# Patient Record
Sex: MALE | Race: WHITE | Employment: FULL TIME | URBAN - METROPOLITAN AREA
[De-identification: names, ages, dates, MRNs, and addresses within clinical notes are randomized per-mention and may not be internally consistent; named-entity substitution may affect disease eponyms.]

---

## 2017-09-19 ENCOUNTER — OFFICE VISIT (OUTPATIENT)
Dept: FAMILY MEDICINE CLINIC | Facility: CLINIC | Age: 22
End: 2017-09-19

## 2017-09-19 VITALS
BODY MASS INDEX: 27.2 KG/M2 | DIASTOLIC BLOOD PRESSURE: 80 MMHG | HEART RATE: 76 BPM | RESPIRATION RATE: 18 BRPM | TEMPERATURE: 98 F | SYSTOLIC BLOOD PRESSURE: 122 MMHG | HEIGHT: 70.5 IN | WEIGHT: 192.13 LBS

## 2017-09-19 DIAGNOSIS — B02.9 HERPES ZOSTER WITHOUT COMPLICATION: Primary | ICD-10-CM

## 2017-09-19 PROCEDURE — 99213 OFFICE O/P EST LOW 20 MIN: CPT | Performed by: FAMILY MEDICINE

## 2017-09-19 RX ORDER — LISDEXAMFETAMINE DIMESYLATE 70 MG/1
CAPSULE ORAL
Refills: 0 | COMMUNITY
Start: 2017-08-17 | End: 2021-04-13

## 2017-09-19 RX ORDER — VALACYCLOVIR HYDROCHLORIDE 1 G/1
1 TABLET, FILM COATED ORAL 3 TIMES DAILY
Qty: 21 TABLET | Refills: 0 | Status: SHIPPED | OUTPATIENT
Start: 2017-09-19 | End: 2017-09-26

## 2017-09-19 RX ORDER — OMEGA-3 FATTY ACIDS/FISH OIL 300-1000MG
CAPSULE ORAL AS NEEDED
COMMUNITY

## 2018-09-07 ENCOUNTER — OFFICE VISIT (OUTPATIENT)
Dept: FAMILY MEDICINE CLINIC | Facility: CLINIC | Age: 23
End: 2018-09-07
Payer: COMMERCIAL

## 2018-09-07 VITALS
DIASTOLIC BLOOD PRESSURE: 82 MMHG | WEIGHT: 193 LBS | SYSTOLIC BLOOD PRESSURE: 122 MMHG | HEART RATE: 88 BPM | TEMPERATURE: 98 F | BODY MASS INDEX: 27 KG/M2

## 2018-09-07 DIAGNOSIS — H66.90 ACUTE OTITIS MEDIA, UNSPECIFIED OTITIS MEDIA TYPE: Primary | ICD-10-CM

## 2018-09-07 PROCEDURE — 99213 OFFICE O/P EST LOW 20 MIN: CPT | Performed by: FAMILY MEDICINE

## 2018-09-07 RX ORDER — NEOMYCIN SULFATE, POLYMYXIN B SULFATE AND HYDROCORTISONE 10; 3.5; 1 MG/ML; MG/ML; [USP'U]/ML
4 SUSPENSION/ DROPS AURICULAR (OTIC) 4 TIMES DAILY
Qty: 1 BOTTLE | Refills: 1 | Status: SHIPPED | OUTPATIENT
Start: 2018-09-07 | End: 2018-09-12

## 2018-09-07 NOTE — PROGRESS NOTES
[facility] [place]  PROGRESS NOTE  Chief Complaint:   Patient presents with:  Ear Pain: left      HPI:   This is a 20year old male coming in for some mild discomfort in the left ear and drainage over the past couple days.   Did notice some drain normal.  Left TM shows inflamed TM with perforation noted inferiorly. There is mild moisture in the ear canal and around the TM. Mouth is normal.  Throat is normal.  Neck: No lymphadenopathy or tenderness.     ASSESSMENT AND PLAN:   Anabel Ha was seen today

## 2020-08-30 NOTE — PROGRESS NOTES
[facility] [place]  PROGRESS NOTE  Chief Complaint:   Patient presents with:  Rash: lower back, tingles, burning earlier today, since wednesday      HPI:   This is a 23year old male coming in for a rash on the lower right side of his back.   He wheezing, cough or sputum. GASTROINTESTINAL:  Denies abdominal pain, nausea, vomiting, constipation, diarrhea, or blood in stool. MUSCULOSKELETAL:  Denies weakness, muscle aches, back pain, joint pain, swelling or stiffness.   NEUROLOGICAL:  Denies headac Problem List:     Herpes zoster without complication      Theresa Chandler MD  9/19/2017  3:12 PM Daily every 1-2 PPD smoker for 25 years. Endorses marijuana and nitrous oxide use

## 2020-09-14 ENCOUNTER — TELEPHONE (OUTPATIENT)
Dept: FAMILY MEDICINE CLINIC | Facility: CLINIC | Age: 25
End: 2020-09-14

## 2020-09-14 DIAGNOSIS — M54.50 ACUTE MIDLINE LOW BACK PAIN WITHOUT SCIATICA: Primary | ICD-10-CM

## 2020-09-14 NOTE — TELEPHONE ENCOUNTER
Patient informed order for Covid19 testing sent  To [facility]. He will call for Appt.   Alexey Aguirre, 09/14/20, 1:36 PM

## 2020-09-14 NOTE — TELEPHONE ENCOUNTER
Needs an order from doctor to go thru the drive thru to get tested for Covid, please give mom a call back. No future appointments.

## 2020-09-14 NOTE — TELEPHONE ENCOUNTER
Patient states he went to [facility] and testing supplies are low. They will not test patient. Requesting  Order for [id number] test to be sent to  [facility].   Sangeeta Ramos, 09/14/20, 12:39 PM

## 2020-09-14 NOTE — TELEPHONE ENCOUNTER
Patient states yesterday he had a temp of 99. Body aches/Slight cough/Sinus congestion. Today no fever, but other sx persist.    Patient states his boss wanting patient to have  Covid19 testing.     Advised [facility] or   [facility]

## 2020-09-14 NOTE — TELEPHONE ENCOUNTER
See previous phone note. Explained to Mom that we do not have a consent on file to speak with her.   Genet Thompson, 09/14/20, 12:40 PM

## 2020-09-15 ENCOUNTER — TELEPHONE (OUTPATIENT)
Dept: FAMILY MEDICINE CLINIC | Facility: CLINIC | Age: 25
End: 2020-09-15

## 2020-09-15 LAB
AMB EXT COVID-19 RESULT: DETECTED
COVID 19 (SARS COV 2): POSITIVE

## 2020-09-15 NOTE — TELEPHONE ENCOUNTER
Patient informed of below. Expressed understanding. Will keep phone note open to call patient tomorrow.   Lucia Guy, 09/15/20, 3:49 PM

## 2020-09-15 NOTE — TELEPHONE ENCOUNTER
Please call Ann Patton. He does have COVID-19. He needs to remain quarantined for the next 10 days or if he remains ill for longer than 10 days he needs to stay at home for 3 days past his last symptom.   He should avoid contact with other people as much as

## 2020-09-16 RX ORDER — AZITHROMYCIN 250 MG/1
TABLET, FILM COATED ORAL
Qty: 6 TABLET | Refills: 0 | Status: SHIPPED | OUTPATIENT
Start: 2020-09-16 | End: 2020-09-21

## 2020-09-16 NOTE — TELEPHONE ENCOUNTER
I will send in a prescription for it azithromycin for him. He should take 2 tablets today, then 1 daily for the next 4 days. Continue to stay in quarantine and drink lots of fluids.

## 2020-09-16 NOTE — TELEPHONE ENCOUNTER
Patient states he still feels like he has a sinus infection. No Fever. Congested/Occsional cough/Back Muscles ache/Can take a deep breath without cough. Please advise.   Edwar Dixon, 09/16/20, 10:45 AM

## 2020-09-26 ENCOUNTER — VIRTUAL PHONE E/M (OUTPATIENT)
Dept: FAMILY MEDICINE CLINIC | Facility: CLINIC | Age: 25
End: 2020-09-26
Payer: COMMERCIAL

## 2020-09-26 DIAGNOSIS — U07.1 LAB TEST POSITIVE FOR DETECTION OF COVID-19 VIRUS: Primary | ICD-10-CM

## 2020-09-26 PROBLEM — F90.2 ATTENTION DEFICIT HYPERACTIVITY DISORDER (ADHD), COMBINED TYPE: Status: ACTIVE | Noted: 2018-03-28

## 2020-09-26 PROCEDURE — G2012 BRIEF CHECK IN BY MD/QHP: HCPCS | Performed by: NURSE PRACTITIONER

## 2020-09-26 NOTE — PROGRESS NOTES
Telephone Check-In    Marisabel Marcum verbally consents to a Virtual/Telephone Check-In service on 09/26/20. Patient understands and accepts financial responsibility for any deductible, co-insurance and/or co-pays associated with this service.     Renzo

## 2020-09-28 ENCOUNTER — TELEPHONE (OUTPATIENT)
Dept: FAMILY MEDICINE CLINIC | Facility: CLINIC | Age: 25
End: 2020-09-28

## 2020-09-28 NOTE — TELEPHONE ENCOUNTER
Pt had virtual visit on Saturday. Pt works for [employer]- pt was supposed to go back today. Pt states his employer did not accept letter that was written on 9/26. Pt states he was told that he would need a negative covid test to return.     Pt states she was

## 2020-09-28 NOTE — TELEPHONE ENCOUNTER
Pt called he wants a c/b regarding \"some recent health concerns\" no further details were given. Please c/b.

## 2020-09-28 NOTE — TELEPHONE ENCOUNTER
[employer] message sent, notified. Referred to the CDC guidelines for confirmed COVID testing with symptoms. Can still test positive for three months after initial positive test as outlined by CDC. Website link attached.   As outlined, you can still test

## 2020-10-23 ENCOUNTER — TELEPHONE (OUTPATIENT)
Dept: FAMILY MEDICINE CLINIC | Facility: CLINIC | Age: 25
End: 2020-10-23

## 2020-10-23 NOTE — TELEPHONE ENCOUNTER
M/ALEJA Ospina out of office today. Informed will give her the message for Monday.   Son Parker, 10/23/20, 4:16 PM

## 2020-10-23 NOTE — TELEPHONE ENCOUNTER
Wants to know if the paperwok he dropped off yesterday for Guanakito Garcia to fill out is ready, would like a call back today.

## 2020-10-26 ENCOUNTER — TELEPHONE (OUTPATIENT)
Dept: FAMILY MEDICINE CLINIC | Facility: CLINIC | Age: 25
End: 2020-10-26

## 2020-10-26 NOTE — TELEPHONE ENCOUNTER
Forms completed/signed. Please let the patient know, he can  copy at his convenience, I placed a copy at the  already.

## 2021-04-13 ENCOUNTER — OFFICE VISIT (OUTPATIENT)
Dept: FAMILY MEDICINE CLINIC | Facility: CLINIC | Age: 26
End: 2021-04-13
Payer: COMMERCIAL

## 2021-04-13 VITALS
BODY MASS INDEX: 25.77 KG/M2 | WEIGHT: 180 LBS | OXYGEN SATURATION: 98 % | SYSTOLIC BLOOD PRESSURE: 138 MMHG | HEIGHT: 70 IN | DIASTOLIC BLOOD PRESSURE: 90 MMHG | HEART RATE: 90 BPM | TEMPERATURE: 98 F

## 2021-04-13 DIAGNOSIS — R19.7 DIARRHEA, UNSPECIFIED TYPE: ICD-10-CM

## 2021-04-13 DIAGNOSIS — H65.03 NON-RECURRENT ACUTE SEROUS OTITIS MEDIA OF BOTH EARS: ICD-10-CM

## 2021-04-13 DIAGNOSIS — J06.9 UPPER RESPIRATORY TRACT INFECTION, UNSPECIFIED TYPE: Primary | ICD-10-CM

## 2021-04-13 PROCEDURE — 99213 OFFICE O/P EST LOW 20 MIN: CPT | Performed by: NURSE PRACTITIONER

## 2021-04-13 PROCEDURE — 3080F DIAST BP >= 90 MM HG: CPT | Performed by: NURSE PRACTITIONER

## 2021-04-13 PROCEDURE — 3008F BODY MASS INDEX DOCD: CPT | Performed by: NURSE PRACTITIONER

## 2021-04-13 PROCEDURE — 3075F SYST BP GE 130 - 139MM HG: CPT | Performed by: NURSE PRACTITIONER

## 2021-04-13 RX ORDER — AZITHROMYCIN 250 MG/1
TABLET, FILM COATED ORAL
Qty: 6 TABLET | Refills: 0 | Status: SHIPPED | OUTPATIENT
Start: 2021-04-13 | End: 2021-04-18

## 2021-04-13 NOTE — PATIENT INSTRUCTIONS
Quarantine until have Covid results. Directed to take antibiotics until gone. Recommend to eat with the antibiotic. Treat symptoms as needed. Return to clinic if not better in 48-72 hours. Otherwise follow-up as needed.

## 2021-04-13 NOTE — PROGRESS NOTES
HPI:    Patient ID: Amanda Carlson is a 30year old male. HPI    Cough that started over the weekend. Was worse yesterday. Also got chills yesterday morning at work. Temp was 97.9 when chilled. Took aleve yesterday morning. No further chills. Frontal sinus tenderness present. Left Sinus: Frontal sinus tenderness present. Mouth/Throat:      Pharynx: Posterior oropharyngeal erythema present. Eyes:      Conjunctiva/sclera: Conjunctivae normal.   Neck:      Thyroid: No thyromegaly.    Ca

## 2021-04-14 ENCOUNTER — TELEPHONE (OUTPATIENT)
Dept: FAMILY MEDICINE CLINIC | Facility: CLINIC | Age: 26
End: 2021-04-14

## 2021-04-14 NOTE — TELEPHONE ENCOUNTER
Chart reviewed. Patient is active on [employer] and report shows that they have read the below result note/recommendations attached to their recent lab report and/or read the message I sent them explaining their results and provider recommendations.

## 2021-04-14 NOTE — TELEPHONE ENCOUNTER
----- Message from ADELFO Sexton sent at 4/14/2021  3:59 PM CDT -----  Please let patient know the Covid test is negative. Thank you.

## 2021-09-10 ENCOUNTER — TELEPHONE (OUTPATIENT)
Dept: FAMILY MEDICINE CLINIC | Facility: CLINIC | Age: 26
End: 2021-09-10

## 2021-09-10 NOTE — TELEPHONE ENCOUNTER
Patient states he was at work. States a zippered bag that her threw on the floor came back up and scratched his right eye. States his eye is red and that the zipper injured his actual eyeball. Patient informed there are no openings today [place] [facility]   [place]

## 2021-12-22 ENCOUNTER — TELEPHONE (OUTPATIENT)
Dept: FAMILY MEDICINE CLINIC | Facility: CLINIC | Age: 26
End: 2021-12-22

## 2021-12-22 NOTE — TELEPHONE ENCOUNTER
Patient exposed to covid19 as above. Patient informed there are no openings today at  [facility] for a video visit. Advised to find testing in the community as there are a few to choose from/agreed.

## 2021-12-22 NOTE — TELEPHONE ENCOUNTER
pt was exposed to covid and employer will not let him  return until tested with negative result- wants order to be sent to local [email or web address]

## 2023-01-12 ENCOUNTER — OFFICE VISIT (OUTPATIENT)
Dept: FAMILY MEDICINE CLINIC | Facility: CLINIC | Age: 28
End: 2023-01-12
Payer: COMMERCIAL

## 2023-01-12 VITALS
WEIGHT: 202.19 LBS | SYSTOLIC BLOOD PRESSURE: 124 MMHG | RESPIRATION RATE: 16 BRPM | HEART RATE: 78 BPM | BODY MASS INDEX: 28.95 KG/M2 | DIASTOLIC BLOOD PRESSURE: 78 MMHG | HEIGHT: 70 IN | TEMPERATURE: 98 F | OXYGEN SATURATION: 98 %

## 2023-01-12 DIAGNOSIS — H66.005 RECURRENT ACUTE SUPPURATIVE OTITIS MEDIA WITHOUT SPONTANEOUS RUPTURE OF LEFT TYMPANIC MEMBRANE: Primary | ICD-10-CM

## 2023-01-12 PROCEDURE — 3008F BODY MASS INDEX DOCD: CPT | Performed by: NURSE PRACTITIONER

## 2023-01-12 PROCEDURE — 3074F SYST BP LT 130 MM HG: CPT | Performed by: NURSE PRACTITIONER

## 2023-01-12 PROCEDURE — 99214 OFFICE O/P EST MOD 30 MIN: CPT | Performed by: NURSE PRACTITIONER

## 2023-01-12 PROCEDURE — 3078F DIAST BP <80 MM HG: CPT | Performed by: NURSE PRACTITIONER

## 2023-01-12 RX ORDER — AMOXICILLIN 875 MG/1
875 TABLET, COATED ORAL 2 TIMES DAILY
Qty: 20 TABLET | Refills: 0 | Status: SHIPPED | OUTPATIENT
Start: 2023-01-12 | End: 2023-01-22

## 2023-01-12 RX ORDER — OFLOXACIN 3 MG/ML
4 SOLUTION/ DROPS OPHTHALMIC 4 TIMES DAILY
Qty: 1 EACH | Refills: 1 | Status: SHIPPED | OUTPATIENT
Start: 2023-01-12 | End: 2023-01-19

## 2023-01-12 NOTE — PATIENT INSTRUCTIONS
Take amox until gone - 10 days     Use ear drops to left ear 3-4 times a day- for 7 days     Follow up if symptoms persist or increase

## 2023-09-08 ENCOUNTER — OFFICE VISIT (OUTPATIENT)
Dept: FAMILY MEDICINE CLINIC | Facility: CLINIC | Age: 28
End: 2023-09-08
Payer: COMMERCIAL

## 2023-09-08 VITALS
HEIGHT: 70 IN | HEART RATE: 101 BPM | TEMPERATURE: 98 F | SYSTOLIC BLOOD PRESSURE: 132 MMHG | RESPIRATION RATE: 18 BRPM | BODY MASS INDEX: 28.92 KG/M2 | DIASTOLIC BLOOD PRESSURE: 80 MMHG | OXYGEN SATURATION: 99 % | WEIGHT: 202 LBS

## 2023-09-08 DIAGNOSIS — R09.82 POST-NASAL DRIP: ICD-10-CM

## 2023-09-08 DIAGNOSIS — H66.005 RECURRENT ACUTE SUPPURATIVE OTITIS MEDIA WITHOUT SPONTANEOUS RUPTURE OF LEFT TYMPANIC MEMBRANE: ICD-10-CM

## 2023-09-08 DIAGNOSIS — R05.1 ACUTE COUGH: ICD-10-CM

## 2023-09-08 DIAGNOSIS — R09.81 CONGESTION OF NASAL SINUS: Primary | ICD-10-CM

## 2023-09-08 PROCEDURE — 3008F BODY MASS INDEX DOCD: CPT

## 2023-09-08 PROCEDURE — 3075F SYST BP GE 130 - 139MM HG: CPT

## 2023-09-08 PROCEDURE — 87637 SARSCOV2&INF A&B&RSV AMP PRB: CPT

## 2023-09-08 PROCEDURE — 99214 OFFICE O/P EST MOD 30 MIN: CPT

## 2023-09-08 PROCEDURE — 3079F DIAST BP 80-89 MM HG: CPT

## 2023-09-08 RX ORDER — AZITHROMYCIN 250 MG/1
TABLET, FILM COATED ORAL
Qty: 6 TABLET | Refills: 0 | Status: SHIPPED | OUTPATIENT
Start: 2023-09-08 | End: 2023-09-12

## 2023-09-09 LAB
FLUAV + FLUBV RNA SPEC NAA+PROBE: NOT DETECTED
FLUAV + FLUBV RNA SPEC NAA+PROBE: NOT DETECTED
RSV RNA SPEC NAA+PROBE: NOT DETECTED
SARS-COV-2 RNA RESP QL NAA+PROBE: NOT DETECTED

## (undated) NOTE — LETTER
Date: 4/13/2021    Patient Name: Gregg Sheriff          To Whom it may concern: This letter has been written at the patient's request. The above patient was seen at the [facility] for treatment of a medical condition.     This patient

## (undated) NOTE — LETTER
Date: 9/26/2020    Patient Name: Kymberly Posey          To Whom it may concern: This letter has been written at the patient's request. The above patient was seen at the [facility] for treatment of a medical condition.     This patient